# Patient Record
Sex: MALE | Race: WHITE | NOT HISPANIC OR LATINO | URBAN - METROPOLITAN AREA
[De-identification: names, ages, dates, MRNs, and addresses within clinical notes are randomized per-mention and may not be internally consistent; named-entity substitution may affect disease eponyms.]

---

## 2022-05-27 ENCOUNTER — TELEPHONE (OUTPATIENT)
Dept: PEDIATRIC CARDIOLOGY | Facility: CLINIC | Age: 22
End: 2022-05-27
Payer: COMMERCIAL

## 2022-05-27 NOTE — TELEPHONE ENCOUNTER
Attempt to schedule f/u with Dr. Sierra in [place] with an ekg and visit, unable to leave voicemail at this time   ----- Message -----  From: Pete Godinez  Sent: 5/26/2022   1:41 PM CDT  To: [facility] Staff    Mom is requesting a callback regarding the pt.  She would like to be advised if the doctor can recommend the pt to see and adult cardiologist because of his age.    [phone] number: [phone]

## 2022-07-14 ENCOUNTER — TELEPHONE (OUTPATIENT)
Dept: PEDIATRIC CARDIOLOGY | Facility: CLINIC | Age: 22
End: 2022-07-14
Payer: COMMERCIAL

## 2022-07-14 NOTE — TELEPHONE ENCOUNTER
Patient unable to due 8/15 appt with Dr. Sierra in [place], [facility] will callback to schedule the visit at a later date.